# Patient Record
(demographics unavailable — no encounter records)

---

## 2025-06-13 NOTE — ASSESSMENT
[FreeTextEntry1] : Counseling provided re: audiogram.  Discussed different hearing aid styles, manufacturers, levels of technology and various features including direct streaming and rechargeable options. Reviewed pros and cons of ITC vs. AMBER style.  Performed DEMO of Oticon Intent 1 MR-R. Fit with 8mm DV domes and set to ACC 1, based on patient's comfort.  Counseled re: realistic expectations with hearing aids and acclimatization period.   Based on patient's lifestyle, they selected Oticon Intent 2 MR-R in terracotta brown color. Ordered with 2(85) receivers and 8mm/10mm DV domes AU. Patient selected smart , if promotion.   Informed of warranties, 45-day trial period, and patient financial responsibility.

## 2025-06-13 NOTE — HISTORY OF PRESENT ILLNESS
[Drainage from ear] : no drainage from ear [Hearing Aid ___] : no hearing aid(s) [Difficulty hearing in group setting] : difficulty hearing in group setting [FreeTextEntry1] : 64 yo female referred by Dr. Nguyen for hearing aid evaluation- medical clearance provided. 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  Patient reports progressive hearing loss for many years, increased difficulties for the past 2 years.  + family hx of hearing loss- father and sister. Sister is fit with hearing aids at our [facility].  Difficulties hearing includes groups, background noise, telephone, television, and increased request for repetitions.

## 2025-07-21 NOTE — ASSESSMENT
[FreeTextEntry1] : Ears fit: AU Device: Oticon Intent 2 MR-R R SN: [id number] SN: [id number] Domes: 10mm DV Receivers: 2(85) Repair/replacement warranty: 8/14/2028 Trial period: 9/4/2025  Programmed hearing aids to most recent audiogram. Performed feedback testing without comments. Programmed to ACC 2. Patient noted improved sound quality and speech clarity in office.  Discussed use, care, and maintenance of hearing aids. Reviewed insertion and removal of hearing aids.  Discussed use of . Paired hearing aids to patient's cell phone. Reviewed use and functionality of direct streaming and Oticon  Morris.   Counseled re: realistic expectations, consistent use of devices, and acclimatization period.   Informed of warranties, 45-day trial period, and financial responsibility. Provided copy of purchase agreement.

## 2025-07-21 NOTE — HISTORY OF PRESENT ILLNESS
[FreeTextEntry1] : 66 yo female referred by Dr. Nguyen for hearing aid evaluation- medical clearance provided. 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 Patient reports progressive hearing loss for many years, increased difficulties for the past 2 years. + family hx of hearing loss- father and sister. Sister is fit with hearing aids at our [facility]. Difficulties hearing includes groups, background noise, telephone, television, and increased request for repetitions.   [FreeTextEntry8] : Patient seen today for dispensing of binaural hearing aids.